# Patient Record
Sex: FEMALE | Race: BLACK OR AFRICAN AMERICAN
[De-identification: names, ages, dates, MRNs, and addresses within clinical notes are randomized per-mention and may not be internally consistent; named-entity substitution may affect disease eponyms.]

---

## 2020-09-10 ENCOUNTER — HOSPITAL ENCOUNTER (EMERGENCY)
Dept: HOSPITAL 72 - EMR | Age: 25
Discharge: HOME | End: 2020-09-10
Payer: COMMERCIAL

## 2020-09-10 VITALS — BODY MASS INDEX: 22.58 KG/M2 | HEIGHT: 59 IN | WEIGHT: 112 LBS

## 2020-09-10 VITALS — SYSTOLIC BLOOD PRESSURE: 119 MMHG | DIASTOLIC BLOOD PRESSURE: 72 MMHG

## 2020-09-10 DIAGNOSIS — O23.40: Primary | ICD-10-CM

## 2020-09-10 DIAGNOSIS — Z3A.00: ICD-10-CM

## 2020-09-10 LAB
APPEARANCE UR: (no result)
APTT PPP: (no result) S
GLUCOSE UR STRIP-MCNC: NEGATIVE MG/DL
KETONES UR QL STRIP: NEGATIVE
LEUKOCYTE ESTERASE UR QL STRIP: (no result)
NITRITE UR QL STRIP: NEGATIVE
PH UR STRIP: 5 [PH] (ref 4.5–8)
PROT UR QL STRIP: NEGATIVE
SP GR UR STRIP: 1.02 (ref 1–1.03)
UROBILINOGEN UR-MCNC: NORMAL MG/DL (ref 0–1)

## 2020-09-10 PROCEDURE — 76801 OB US < 14 WKS SINGLE FETUS: CPT

## 2020-09-10 PROCEDURE — 99284 EMERGENCY DEPT VISIT MOD MDM: CPT

## 2020-09-10 PROCEDURE — 81025 URINE PREGNANCY TEST: CPT

## 2020-09-10 PROCEDURE — 81003 URINALYSIS AUTO W/O SCOPE: CPT

## 2020-09-10 PROCEDURE — 84703 CHORIONIC GONADOTROPIN ASSAY: CPT

## 2020-09-10 PROCEDURE — 84702 CHORIONIC GONADOTROPIN TEST: CPT

## 2020-09-10 PROCEDURE — 76817 TRANSVAGINAL US OBSTETRIC: CPT

## 2020-09-10 NOTE — EMERGENCY ROOM REPORT
History of Present Illness


General


Chief Complaint:  Female Urogenital Problems


Source:  Patient


 (Zhane Calderon)





Present Illness


HPI


25-year-old female presents to the emergency department complaining of 3 out of 

10 severity low back pain in addition to urinary frequency x1 week.  She denies 

dysuria or hematuria. Denies fevers or chills. She denies suspicion of 

pregnancy.  Patient denies vaginal discharge.  Patient denies abdominal pain or 

tenderness.  She denies nausea or vomiting.  She denies headache.  She denies 

constipation or diarrhea.


 (Zhane Calderon)


Allergies:  


Coded Allergies:  


     No Known Allergies (Unverified , 9/10/20)





COVID-19 Screening


Contact w/high risk pt:  No


Experienced COVID-19 symptoms?:  No


COVID-19 Testing performed PTA:  No


 (Zhane Caldeorn)





Patient History


Past Medical History:  see triage record


Past Surgical History:  none


Pertinent Family History:  none


Last Menstrual Period:  07/27/20


Reviewed Nursing Documentation:  PMH: Agreed; PSxH: Agreed (Zhane Calderon)





Nursing Documentation-PMH


Past Medical History:  No Stated History


 (Zhane Calderon)





Review of Systems


All Other Systems:  negative except mentioned in HPI


 (Zhane Calderon)





Physical Exam





Vital Signs








  Date Time  Temp Pulse Resp B/P (MAP) Pulse Ox O2 Delivery O2 Flow Rate FiO2


 


9/10/20 18:00 98.8 100 16 119/72 (88) 99 Room Air  








Sp02 EP Interpretation:  reviewed, normal


General Appearance:  no apparent distress, alert, GCS 15, non-toxic


Head:  normocephalic, atraumatic


Eyes:  bilateral eye normal inspection, bilateral eye PERRL


ENT:  hearing grossly normal, normal voice


Neck:  full range of motion


Respiratory:  lungs clear, normal breath sounds, speaking full sentences


Cardiovascular #1:  regular rate, rhythm


Gastrointestinal:  normal bowel sounds, non tender, soft, non-distended


Genitourinary:  normal inspection, no CVA tenderness


Musculoskeletal:  back normal, normal range of motion, gait/station normal, non-

tender


Neurologic:  alert, motor strength/tone normal, oriented x3, sensory intact, 

responsive, speech normal


Psychiatric:  judgement/insight normal


Skin:  normal color


 (Zhane Calderon)





Medical Decision Making


PA Attestation


Dr. Fields is my supervising Physician whom patient management has been 

discussed with. 


 (Zhane Calderon)


Diagnostic Impression:  


 Primary Impression:  


 Urinary frequency


 Additional Impressions:  


 Pregnancy


 Qualified Codes:  Z34.90 - Encounter for supervision of normal pregnancy, 

unspecified, unspecified trimester


 UTI (urinary tract infection)


 Qualified Codes:  N39.0 - Urinary tract infection, site not specified


ER Course


25-year-old female presents to the emergency department complaining of 3 out of 

10 severity low back pain in addition to urinary frequency x1 week.  She denies 

dysuria or hematuria. Denies fevers or chills. She denies suspicion of 

pregnancy.  Patient denies vaginal discharge.  Patient denies abdominal pain or 

tenderness.  She denies nausea or vomiting.  She denies headache.  She denies 

constipation or diarrhea.





Ddx considered but are not limited to UTi , Pyelo, STI, Stone, Cystitis


Vital signs: are WNL, pt. is afebrile 


H&PE are most consistent with UTI 


ORDERS:


- UA labs are attached  


ED INTERVENTIONS: None required at this time. 


DISCHARGE: At this time pt. is stable for d/c to home. Will provide printed 

patient care instructions, and any necessary prescriptions. Care plan and 

follow up instructions have been discussed with the patient prior to discharge. 


 (Zhane Calderon)


ER Course


25-year-old female here with UTI. 


U pregnant is positive.  Ultrasound confirms intrauterine pregnancy. No  fetal 

heart activity found yet as pregnancy is early. 


Unlikely ruptured ectopic at this time point, patients abdomen is not 

peritoneal, no significant tenderness. She has remained hemodynamically stable 

in ED. 


The patients presentation is not consistent with  hemorrhagic ovarian cyst or 

torsion, and has no significant tenderness on exam.  





The patient appears stable for discharge home and follow up here in the ED in 12

-24 hrs for repeat HCG quant testing, reevaluation and further treatment.


Will DC with Keflex. UCx will be sent. 


Pt advised to follow up with [facility] RAMIREZ in AM for referral for prenatal care. 


She verbalizes her understanding of this.


Counseled patient to stop smoking marijuna and to stop drinking alcohol.


She is unwilling to wait for bhcg quant and states she needs to go and would 

like a call at [phone].  


 (Nicole Fields D.O.)





CT/MRI/US Diagnostic Results


CT/MRI/US Diagnostic Results :  


   Imaging Test Ordered:  US Pelvic OB


   Impression


official radiology report is pending at time of sign out


 (Zhane Calderon)


CT/MRI/US Diagnostic Results :  


   Impression


US OB Transvaginal


EXAM:


  US Pelvis Transvaginal


FINDINGS:


  Uterus: Measures 8.8 x 3.8 x 4.3 cm. Gestational sac identified within 


the endometrial cavity, with mean sac diameter measuring 1.59 cm. Yolk 


sac noted. No fetal pole identified on this exam. Cervix is long and 


closed.


  Placenta/amniotic fluid: Cannot be adequately evaluated due to the 


early gestational age.


  Ovaries: The right ovary measures 2.3 x 2.4 x 3.7 cm. Probable corpus 


luteum in the right ovary. The left ovary measures 2.5 x 1.9 x 2.7 cm. 


The ovaries demonstrate normal color flow.


 


  Other: No free fluid is identified. No adnexal mass.


 


  LMP: 7/27/2020


  GA by LMP: 6 weeks 3 days


  BRY by LMP: 5/3/2021


 


  Average ultrasound age: 5 weeks 6 days


  BRY by ultrasound: 5/7/2021


 


IMPRESSION:   


  Intrauterine gestational sac with yolk sac noted. No fetal pole 


identified at this time. Findings may be due to early stage of pregnancy. 


Please correlate with serial beta hCG measurements and short-term follow-


up exam if clinically indicated.





Dictated By: Herrera Hooks MD


 (Nicole Fields D.O.)





Last Vital Signs








  Date Time  Temp Pulse Resp B/P (MAP) Pulse Ox O2 Delivery O2 Flow Rate FiO2


 


9/10/20 18:00 98.8 100 16 119/72 (88) 99 Room Air  








 (Zhane Calderon)


Reevaluation Time:  20:22


Status:  improved


 (Nicole Fields D.O.)


Disposition:  HOME, SELF-CARE


Admit Decision Time:  20:22


 (Nicole Fields D.O.)


Condition:  Stable


Scripts


Cephalexin* (KEFLEX*) 500 Mg Capsule


500 MG ORAL EVERY 12 HOURS, #14 CAP 0 Refills


   Prov: Nicole Fields D.O.         9/10/20


Patient Instructions:  Urinary Tract Infection





Additional Instructions:  


Take medications as directed. 





 ** Follow up with a OBGYN  within 3 days, even if your symptoms have resolved. 

**   To have repeat Hcg Quant blood work and US performed   **





Return sooner to ED if new symptoms occur, or current symptoms become worse. 











- Please note that this Emergency Department Report was dictated using [employer] technology software, occasionally this can lead to 

erroneous entry secondary to interpretation by the dictation equipment.











Zhane Calderon Sep 10, 2020 18:13


Nicole Fields D.O. Sep 10, 2020 20:26

## 2020-09-10 NOTE — DIAGNOSTIC IMAGING REPORT
EXAM:

  US First Trimester Pregnancy, Transabdominal

 

CLINICAL HISTORY:

  PAIN

 

TECHNIQUE:

  Real-time transabdominal obstetrical ultrasound of the maternal pelvis 

and a first trimester pregnancy with image documentation.

 

COMPARISON:

  None

 

FINDINGS:

  Uterus: Measures 8.8 x 3.8 x 4.3 cm. Gestational sac identified within 

the endometrial cavity, with mean sac diameter measuring 1.59 cm.  Yolk 

sac noted.  No fetal pole identified on this exam.  Cervix is long and 

closed.

 

  Placenta/amniotic fluid: Cannot be adequately evaluated due to the 

early gestational age.

 

  Ovaries: The right ovary measures 2.3 x 2.4 x 3.7 cm.  Probable corpus 

luteum in the right ovary.  The left ovary measures 2.5 x 1.9 x 2.7 cm.  

The ovaries demonstrate normal color flow.

 

  Other: No free fluid is identified. No adnexal mass.

 

  LMP: 7/27/2020

  GA by LMP: 6 weeks 3 days

  BRY by LMP: 5/3/2021

 

  Average ultrasound age: 5 weeks 6 days

  BRY by ultrasound: 5/7/2021

 

IMPRESSION:   

  Intrauterine gestational sac with yolk sac noted.  No fetal pole 

identified at this time.  Findings may be due to early stage of pregnancy.

  Please correlate with serial beta hCG measurements and short-term 

follow-up exam if clinically indicated.

## 2020-09-10 NOTE — DIAGNOSTIC IMAGING REPORT
EXAM:

  US Pelvis Transvaginal

 

CLINICAL HISTORY:

  PAIN

 

TECHNIQUE:

  Real-time transvaginal pelvic ultrasound with image documentation.  

Transvaginal imaging was used for better evaluation of the endometrium 

and adnexa.

 

COMPARISON:

  None

 

FINDINGS:

  Uterus: Measures 8.8 x 3.8 x 4.3 cm. Gestational sac identified within 

the endometrial cavity, with mean sac diameter measuring 1.59 cm. Yolk 

sac noted. No fetal pole identified on this exam. Cervix is long and 

closed.

 

  Placenta/amniotic fluid: Cannot be adequately evaluated due to the 

early gestational age.

 

  Ovaries: The right ovary measures 2.3 x 2.4 x 3.7 cm. Probable corpus 

luteum in the right ovary. The left ovary measures 2.5 x 1.9 x 2.7 cm. 

The ovaries demonstrate normal color flow.

 

  Other: No free fluid is identified. No adnexal mass.

 

  LMP: 7/27/2020

  GA by LMP: 6 weeks 3 days

  BRY by LMP: 5/3/2021

 

  Average ultrasound age: 5 weeks 6 days

  BRY by ultrasound: 5/7/2021

 

IMPRESSION:   

  Intrauterine gestational sac with yolk sac noted. No fetal pole 

identified at this time. Findings may be due to early stage of pregnancy. 

Please correlate with serial beta hCG measurements and short-term follow-

up exam if clinically indicated.